# Patient Record
Sex: FEMALE
[De-identification: names, ages, dates, MRNs, and addresses within clinical notes are randomized per-mention and may not be internally consistent; named-entity substitution may affect disease eponyms.]

---

## 2017-08-01 ENCOUNTER — HOSPITAL ENCOUNTER (EMERGENCY)
Dept: HOSPITAL 42 - ED | Age: 27
Discharge: HOME | End: 2017-08-01
Payer: MEDICAID

## 2017-08-01 VITALS — BODY MASS INDEX: 19.6 KG/M2

## 2017-08-01 VITALS
RESPIRATION RATE: 15 BRPM | OXYGEN SATURATION: 99 % | HEART RATE: 89 BPM | SYSTOLIC BLOOD PRESSURE: 122 MMHG | TEMPERATURE: 99.2 F | DIASTOLIC BLOOD PRESSURE: 75 MMHG

## 2017-08-01 DIAGNOSIS — H00.11: Primary | ICD-10-CM

## 2017-08-01 NOTE — ED PDOC
Arrival/HPI





- General


Historian: Patient





- General


Chief Complaint: Eye Problem


Time Seen by Provider: 08/01/17 09:45





- History of Present Illness


Narrative History of Present Illness (Text): 





08/01/17 10:04


26 yo female come in for evaluation of Right eye swelling over upper eyelid 

associated with mild pain gradually developed for past 2 days. Otherwise, pt 

denies known trauma or injury, fever, chills, denies visual changes, blurry 

vision, double vision, eye discharge, pain with eye movement, denies contact 

use. Ambulate to Ed for evaluation, not in any apparent distress. (Emerita Pearson)





Past Medical History





- Provider Review


Nursing Documentation Reviewed: Yes





- Travel History


Have you recently traveled outside [place] w/in the past 3 mons?: No





- Past History


Past History: No Previous





- Tetanus Immunization


Tetanus Immunization: Unknown





- Cardiac


Hx Cardiac Disorders: No





- Pulmonary


Hx Respiratory Disorders: No





- Neurological


Hx Neurological Disorder: No





- HEENT


Hx HEENT Disorder: No





- Renal


Hx Renal Disorder: No





- Endocrine/Metabolic


Hx Endocrine Disorders: No





- Hematological/Oncological


Hx Anemia: Yes





- Integumentary


Hx Dermatological Disorder: No





- Musculoskeletal/Rheumatological


Hx Musculoskeletal Disorders: No





- Gastrointestinal


Hx Gastrointestinal Disorders: No





- Genitourinary/Gynecological


Hx Genitourinary Disorders: No





- Psychiatric


Hx Psychophysiologic Disorder: No


Hx Substance Use: No





Family/Social History





- Physician Review


Nursing Documentation Reviewed: Yes


Family/Social History: No Known Family HX


Smoking Status: Never Smoked


Hx Alcohol Use: No


Hx Substance Use: No





Allergies/Home Meds


Allergies/Adverse Reactions: 


Allergies





diphenhydramine [From Benadryl] Allergy (Verified 08/01/17 09:24)


 .anxiety











Review of Systems





- Physician Review


All systems were reviewed & negative as marked: Yes





- Review of Systems


Constitutional: Normal


Eyes: Eye Pain


ENT: Normal


Respiratory: Normal


Musculoskeletal: Normal


Skin: Normal


Neurological: Normal


Endocrine: Normal


Hemo/Lymphatic: Normal


Psychiatric: Normal





Physical Exam


Vital Signs Reviewed: Yes


Temperature: Afebrile


Blood Pressure: Normal


Pulse: Regular


Respiratory Rate: Normal


Appearance: Positive for: Well-Appearing, Non-Toxic, Comfortable


Pain Distress: Mild


Mental Status: Positive for: Alert and Oriented X 3





- Systems Exam


Head: Present: Normocephalic


Pupils: Present: PERRL


Extroacular Muscles: Present: EOMI, Other (no pain or limitation on extraocular 

movement B/L)


Conjunctiva: Present: Normal, Other (Right eye: mild edema over upper lateral 

eyelid  with small tender swelling noted inside upper eyelid c/w chalazion. No 

periorbital edema or erythema. Conjuctiva clear, no discharge.)


Ears: Present: Normal, Normal Canal


Mouth: Present: Moist Mucous Membranes, Normal Lips.  No: Drooling


Pharnyx: No: ERYTHEMA, EXUDATE, TONSILS ENLARGED


Nose (Internal): Present: Normal Inspection


Neck: Present: Trachea Midline.  No: Meningeal Signs, JVD


Neurological: Present: Speech Normal


Skin: Present: Warm, Dry, Normal Color


Lymphatic: No: Cervical Adenopathy


Psychiatric: Present: Alert, Oriented x 3





Medical Decision Making


ED Course and Treatment: 





08/01/17 


On re-evaluation, pt is afebrile, hemodynamicaly stable.


non-toxic.


VA: R20/20, L20/20, B/L20/20


Right eye; exam c/w chalazion.


Pt advised on course of ds.


ref. to f/u with opht in 1-2 days for re-evaluation.


Return to Ed if any worsening or new changes.








 (Emerita Pearson)


I was available for consultation during PA evaluation. The chart was reviewed 

by me, and I agree with disposition. The documented history was done by the 

physician extender. The documented physical exam was done by the physician 

extender. The documented procedures were done by the physician extender.





 (Luis Alberto Wood)





Disposition/Present on Arrival





- Present on Arrival


Any Indicators Present on Arrival: No


History of DVT/PE: No


History of Uncontrolled Diabetes: No


Urinary Catheter: No


History of Decub. Ulcer: No


History Surgical Site Infection Following: None





- Disposition


Have Diagnosis and Disposition been Completed?: Yes


Disposition Time: 09:48





- Disposition


Diagnosis: 


 Chalazion





Disposition: HOME/ ROUTINE


Condition: STABLE


Discharge Instructions (ExitCare):  Chalazion (ED)


Print Language: Yoruba


Additional Instructions: 


WARM COMPRESSES TO RIGHT EYE 





TAKE MEDICATION AS PRESCRIBED





FOLLOW UP WITH OPHTHALMOLOGY IN 2-3 DAYS FOR RE-EVALUATION.


RETURN TO ED IF ANY WORSENING OR NEW CHANGES.


Prescriptions: 


Neomycin/Polymyxin/Dexamethaso [Dexamethasone/Neomycin/Polymyxin 5 Ml] 2 drop 

RIGHTEYE Q4 #1 bottle


Referrals: 


Gennaro Smith MD [Staff Provider] - Follow up with primary


Forms:  [facility] (Portuguese), WORK NOTE

## 2017-09-24 ENCOUNTER — HOSPITAL ENCOUNTER (EMERGENCY)
Dept: HOSPITAL 42 - ED | Age: 27
Discharge: HOME | End: 2017-09-24
Payer: MEDICAID

## 2017-09-24 VITALS
SYSTOLIC BLOOD PRESSURE: 111 MMHG | HEART RATE: 74 BPM | DIASTOLIC BLOOD PRESSURE: 73 MMHG | RESPIRATION RATE: 18 BRPM | OXYGEN SATURATION: 97 %

## 2017-09-24 VITALS — TEMPERATURE: 98.3 F

## 2017-09-24 VITALS — BODY MASS INDEX: 19.6 KG/M2

## 2017-09-24 DIAGNOSIS — D64.9: ICD-10-CM

## 2017-09-24 DIAGNOSIS — R10.9: Primary | ICD-10-CM

## 2017-09-24 LAB
ALBUMIN/GLOB SERPL: 1.4 {RATIO} (ref 1.1–1.8)
ALP SERPL-CCNC: 52 U/L (ref 38–126)
ALT SERPL-CCNC: 30 U/L (ref 7–56)
APPEARANCE UR: CLEAR
AST SERPL-CCNC: 25 U/L (ref 14–36)
BACTERIA #/AREA URNS HPF: (no result) /[HPF]
BASOPHILS # BLD AUTO: 0.01 K/MM3 (ref 0–2)
BASOPHILS NFR BLD: 0.1 % (ref 0–3)
BILIRUB SERPL-MCNC: 0.4 MG/DL (ref 0.2–1.3)
BILIRUB UR-MCNC: NEGATIVE MG/DL
BUN SERPL-MCNC: 11 MG/DL (ref 7–21)
CALCIUM SERPL-MCNC: 8.9 MG/DL (ref 8.4–10.5)
CHLORIDE SERPL-SCNC: 106 MMOL/L (ref 98–107)
CO2 SERPL-SCNC: 24 MMOL/L (ref 21–33)
COLOR UR: YELLOW
EOSINOPHIL # BLD: 0.1 10*3/UL (ref 0–0.7)
EOSINOPHIL NFR BLD: 0.8 % (ref 1.5–5)
EPI CELLS #/AREA URNS HPF: (no result) /HPF (ref 0–5)
ERYTHROCYTE [DISTWIDTH] IN BLOOD BY AUTOMATED COUNT: 13 % (ref 11.5–14.5)
GLOBULIN SER-MCNC: 3.1 GM/DL
GLUCOSE SERPL-MCNC: 93 MG/DL (ref 70–110)
GLUCOSE UR STRIP-MCNC: NEGATIVE MG/DL
GRANULOCYTES # BLD: 4.9 10*3/UL (ref 1.4–6.5)
GRANULOCYTES NFR BLD: 68.6 % (ref 50–68)
HCT VFR BLD CALC: 36.9 % (ref 36–48)
KETONES UR STRIP-MCNC: NEGATIVE MG/DL
LEUKOCYTE ESTERASE UR-ACNC: (no result) LEU/UL
LYMPHOCYTES # BLD: 1.8 10*3/UL (ref 1.2–3.4)
LYMPHOCYTES NFR BLD AUTO: 24.5 % (ref 22–35)
MCH RBC QN AUTO: 29.6 PG (ref 25–35)
MCHC RBC AUTO-ENTMCNC: 33.3 G/DL (ref 31–37)
MCV RBC AUTO: 88.7 FL (ref 80–105)
MONOCYTES # BLD AUTO: 0.4 10*3/UL (ref 0.1–0.6)
MONOCYTES NFR BLD: 6 % (ref 1–6)
PH UR STRIP: 6 [PH] (ref 4.7–8)
PLATELET # BLD: 155 10^3/UL (ref 120–450)
PMV BLD AUTO: 10.8 FL (ref 7–11)
POTASSIUM SERPL-SCNC: 4.1 MMOL/L (ref 3.6–5)
PROT SERPL-MCNC: 7.5 G/DL (ref 5.8–8.3)
PROT UR STRIP-MCNC: NEGATIVE MG/DL
RBC # UR STRIP: NEGATIVE /UL
RBC #/AREA URNS HPF: NEGATIVE /HPF (ref 0–2)
SODIUM SERPL-SCNC: 143 MMOL/L (ref 132–148)
SP GR UR STRIP: 1.02 (ref 1–1.03)
UROBILINOGEN UR STRIP-ACNC: 0.2 E.U./DL
WBC # BLD AUTO: 7.2 10^3/UL (ref 4.5–11)

## 2017-09-24 NOTE — US
HISTORY:

Left lower quadrant pain



COMPARISON:

1/18/2016.



TECHNIQUE:

Transabdominal pelvic ultrasound was performed.



FINDINGS:



UTERUS:

Measures 8.6 x 3.6 x 4.6 cm. The uterus is anteverted.  There is a 

3.4 x 3.4 x 2.8 cm heterogeneous predominantly hyperechoic mass in 

the fundus and posterior wall of the midbody of the uterus.



ENDOMETRIUM:

Measures 5.0 mm in diameter. Unremarkable. 



CERVIX:

No cervical abnormality identified.



RIGHT OVARY:

Measures 2.3 x 1.9 x 2.0 cm. No solid mass. Normal flow. 



LEFT OVARY:

Measures 2.9 x 2.4 x 2.8 cm. No solid mass. Normal flow. There is a 

3.0 cm echogenic lesion in the ovary with mild increased vascularity.



FREE FLUID:

There is small amount free fluid noted.



OTHER FINDINGS:

None. 



IMPRESSION:

1. 3.4 cm heterogeneous fundal and posterior wall mass in the uterus 

is nonspecific.  The differential considerations include fibroid 

however other neoplasms cannot be entirely excluded. Transvaginal 

pelvic ultrasound/MRI would be helpful for further evaluation.



2. Question of hemorrhagic cyst in the left ovary.

## 2017-09-24 NOTE — ED PDOC
Arrival/HPI





- General


Chief Complaint: Abdominal Pain


Time Seen by Provider: 09/24/17 10:02


Historian: Patient





- History of Present Illness


Narrative History of Present Illness (Text): 


09/24/17 10:23





A 27 year old female, with no significant past medical history, presents with 2 

week duration, intermittent left lower quadrant abdominal pain. The patient 

denies any  symptoms, fevers, chills, headache, dizziness, chest pain, 

shortness of breath, dyspnea on exertion, cough, nausea, vomiting, diarrhea, 

back pain, neck pain, dysuria, or any other complaint.








PMD: Dr. ARCENIO Umanzor





Time/Duration: Other (2 weeks)


Symptom Onset: Sudden


Symptom Course: Unchanged


Activities at Onset: Rest, Light


Context: Home





Past Medical History





- Provider Review


Nursing Documentation Reviewed: Yes





- Past History


Past History: No Previous





- Infectious Disease


Hx of Infectious Diseases: None





- Tetanus Immunization


Tetanus Immunization: Unknown





- Cardiac


Hx Cardiac Disorders: No





- Pulmonary


Hx Respiratory Disorders: No





- Neurological


Hx Neurological Disorder: No





- HEENT


Hx HEENT Disorder: No





- Renal


Hx Renal Disorder: No





- Endocrine/Metabolic


Hx Endocrine Disorders: No





- Hematological/Oncological


Hx Anemia: Yes





- Integumentary


Hx Dermatological Disorder: No





- Musculoskeletal/Rheumatological


Hx Musculoskeletal Disorders: No





- Gastrointestinal


Hx Gastrointestinal Disorders: No





- Genitourinary/Gynecological


Hx Genitourinary Disorders: No





- Psychiatric


Hx Psychophysiologic Disorder: No


Hx Substance Use: No





- Anesthesia


Hx Anesthesia: No





Family/Social History





- Physician Review


Nursing Documentation Reviewed: Yes


Family/Social History: No Known Family HX


Smoking Status: Never Smoked


Hx Alcohol Use: No


Hx Substance Use: No





Allergies/Home Meds


Allergies/Adverse Reactions: 


Allergies





diphenhydramine [From Benadryl] Allergy (Verified 09/24/17 10:00)


 .anxiety











Review of Systems





- Physician Review


All systems were reviewed & negative as marked: Yes





Physical Exam





- Physical Exam


Narrative Physical Exam (Text): 


- Review of Systems





Constitutional: Normal.  absent: Fatigue, Weight Change, Fevers


Eyes: Normal


ENT: denies sore throat, denies tristhmus


Respiratory: Normal.  absent: SOB, Cough, Sputum


Cardiovascular: absent: Chest Pain, Palpitations, Syncope 


Gastrointestinal: (+) LLQ Abdominal Pain.  absent: Diarrhea, Nausea, Vomiting


Genitourinary: Normal.  absent: Dysuria, Frequency, Hematuria, vaginal bleeding


Musculoskeletal: Normal.  absent: Arthralgias, Back Pain, Neck Pain


Skin: no rashes, no erythema


Neurological: absent: Focal Weakness


Endocrine: Normal


Hemo/Lymphatic: Normal


Psychiatric: No suicidal or homicidal ideations





Physical exam





Patient appears age appropriate in no distress, speaking full sentences without 

difficulty





- Systems Exam


Head: Present: Atraumatic, Normocephalic


Pupils: Present: PERRL


Extroacular Muscles: Present: EOMI


Conjunctiva: Present: Normal


Mouth: Present: Moist Mucous Membranes


Neck: Present: Normal Range of Motion.  No: MIDLINE TENDERNESS, Paraspinal 

Tenderness


Respiratory/Chest: Present: Clear to Auscultation, Good Air Exchange.  No: 

Respiratory Distress, Accessory Muscle Use, Tachypneic


Cardiovascular: Present: Regular Rate and Rhythm, Normal S1, S2, Peripheal 

Pulses Present.  No: Murmurs


Abdomen: Present: Suprapubic and LLQ Tenderness to palpation. Normal Bowel 

Sounds.  No: Distention, Peritoneal Signs, Rebound, Guarding


Back: Present: Normal Inspection.  No: Midline Tenderness, Paraspinal Tenderness


Upper Extremity: Present: Normal Inspection.  No: Cyanosis, Edema


Lower Extremity: Present: Normal Inspection.  No: Edema


Neurological: Present: GCS=15, Speech Normal, cranial nerves II through XII 

fully intact with no cerebellar abnormality, neurosensory fully intact. No 

focal neurological deficits.


Skin: Present: Warm, Dry, Normal Color.  No: Rashes


Lymphatic: Present: OX3, NI, NC


Psychiatric: Present: Alert, Oriented x 3, Normal Insight, Normal Concentration











Vital Signs Reviewed: Yes


Vital Signs











  Temp Pulse Resp BP Pulse Ox


 


 09/24/17 09:58  98.3 F  84  16  124/80  100











Temperature: Afebrile


Blood Pressure: Normal


Pulse: Regular


Respiratory Rate: Normal


Appearance: Positive for: Well-Appearing, Non-Toxic, Comfortable


Pain Distress: None


Mental Status: Positive for: Alert and Oriented X 3





Medical Decision Making


ED Course and Treatment: 


09/24/17 10:29





Impression:


A 27 year old female with 2 week duration, intermittent LLQ abdominal pain. On 

exam, patient has Suprapubic and LLQ tenderness on palpation.





Differential Diagnosis included but are not limited to:  UTI vs. Ovarian Cysts








Plan:


-- Pelvic Ultrasound 


-- Labs


-- Urinalysis


-- Reassess and disposition








Prior Visits:


Notes and results from previous visits were reviewed. Patient was last seen in 

the emergency department on 08/01/2017 for right eye swelling and mild pain.





Progress Notes:





09/24/17 10:29: Pregnancy test negative.





09/24/17 12:22


UA with no evidence for infection


Cx and GC/Chlam sent





US Dr Avila IMPRESSION:


1. 3.4 cm heterogeneous fundal and posterior wall mass in the uterus is 

nonspecific.  The differential considerations include fibroid however other 

neoplasms cannot be entirely excluded. Transvaginal pelvic ultrasound/MRI would 

be helpful for further evaluation.


2. Question of hemorrhagic cyst in the left ovary.





pt informed of finding and instructed to fu with GYN specialist for further w/u





On reevaluation, patient reports that she feels much better and would like to 

be discharged home. Patient's repeat abdominal exam is soft, nontender, non 

distended with positive bowel sounds in all 4 quadrants and no peritoneal 

signs. Patient is tolerating PO without any difficulty.





Pt states she understands to return to the ER right away for new or worsening 

symptoms or for inability to f/u with PMD or specialist as instructed. 





Patient states that she fully agrees with and understands discharge 

instructions. States that she agrees with the plan and disposition. Verbalized 

and repeated discharge instructions and plan. I have given the patient 

opportunity to ask any additional questions. 





- Lab Interpretations


Lab Results: 








 09/24/17 10:29 





 09/24/17 10:29 





 Lab Results





09/24/17 10:29: Sodium 143, Potassium 4.1, Chloride 106, Carbon Dioxide 24, 

Anion Gap 17, BUN 11, Creatinine 0.6, Est GFR (African Amer) > 60, Est GFR (Non-

Af Amer) > 60, Random Glucose 93, Calcium 8.9, Total Bilirubin 0.4, AST 25, ALT 

30, Alkaline Phosphatase 52, Total Protein 7.5, Albumin 4.4, Globulin 3.1, 

Albumin/Globulin Ratio 1.4


09/24/17 10:29: WBC 7.2, RBC 4.16, Hgb 12.3, Hct 36.9, MCV 88.7, MCH 29.6, MCHC 

33.3, RDW 13.0, Plt Count 155, MPV 10.8, Gran % 68.6 H, Lymph % (Auto) 24.5, 

Mono % (Auto) 6.0, Eos % (Auto) 0.8 L, Baso % (Auto) 0.1, Gran # 4.90, Lymph # 

1.8, Mono # 0.4, Eos # 0.1, Baso # 0.01


09/24/17 10:17: Urine Color Yellow, Urine Appearance Clear, Urine pH 6.0, Ur 

Specific Gravity 1.020, Urine Protein Negative, Urine Glucose (UA) Negative, 

Urine Ketones Negative, Urine Blood Negative, Urine Nitrate Negative, Urine 

Bilirubin Negative, Urine Urobilinogen 0.2, Ur Leukocyte Esterase Large H, 

Urine RBC Negative, Urine WBC 5 - 10, Ur Epithelial Cells Many, Urine Bacteria 

Small








I have reviewed the lab results: Yes





- RAD Interpretation


Radiology Orders: 








09/24/17 10:19


Pelvis [PELVIS ULTRASOUND] [US] Routine 














- Scribe Statement


The provider has reviewed the documentation as recorded by the Scribe


Mellisa Palomares 





Provider Scribe Attestation:


All medical record entries made by the Scribe were at my direction and 

personally dictated by me. I have reviewed the chart and agree that the record 

accurately reflects my personal performance of the history, physical exam, 

medical decision making, and the department course for this patient. I have 

also personally directed, reviewed, and agree with the discharge instructions 

and disposition.








Disposition/Present on Arrival





- Present on Arrival


Any Indicators Present on Arrival: No


History of DVT/PE: No


History of Uncontrolled Diabetes: No


Urinary Catheter: No


History of Decub. Ulcer: No


History Surgical Site Infection Following: None





- Disposition


Have Diagnosis and Disposition been Completed?: Yes


Diagnosis: 


 Abdominal pain





Disposition: HOME/ ROUTINE


Disposition Time: 12:24


Patient Plan: Discharge


Condition: GOOD


Discharge Instructions (ExitCare):  Ovarian Cyst (ED)


Additional Instructions: 


PLEASE RETURN TO THE EMERGENCY DEPARTMENT FOR NEW OR WORSENING SYMPTOMS. RETURN 

RIGHT AWAY IF YOU CANNOT FOLLOW UP WITH YOUR PRIMARY CARE DOCTOR, CLINIC, OR 

SPECIALIST IN 1-2 DAYS. 


Prescriptions: 


Ibuprofen [Motrin] 600 mg PO Q8 PRN #12 tab


 PRN Reason: Pain, Moderate (4-7)


Referrals: 


Shin Umanzor APN [Primary Care Provider] - Follow up with primary


Alvarado Paredes [Medical Doctor] - Follow up with primary


Forms:  [employer] [facility] (English), WORK NOTE

## 2018-02-13 ENCOUNTER — HOSPITAL ENCOUNTER (EMERGENCY)
Dept: HOSPITAL 42 - ED | Age: 28
Discharge: HOME | End: 2018-02-13
Payer: MEDICAID

## 2018-02-13 VITALS
TEMPERATURE: 99 F | OXYGEN SATURATION: 100 % | HEART RATE: 96 BPM | RESPIRATION RATE: 18 BRPM | DIASTOLIC BLOOD PRESSURE: 72 MMHG | SYSTOLIC BLOOD PRESSURE: 114 MMHG

## 2018-02-13 VITALS — BODY MASS INDEX: 19.6 KG/M2

## 2018-02-13 DIAGNOSIS — J11.1: Primary | ICD-10-CM

## 2018-02-13 NOTE — ED PDOC
Arrival/HPI





- General


Chief Complaint: Flu-like Symptoms


Time Seen by Provider: 02/13/18 11:38


Historian: Patient





- History of Present Illness


Narrative History of Present Illness (Text): 


02/13/18 12:15


A 27 year old female, with no significant past medical history, presents to the 

emergency department complaining of sore throat and myalgias since last night. 

Patient reports also experiencing chills and slight coughing. Patient denies 

any other symptoms. Has been asked if she wants to take flu test or to be given 

prescription for Tamiflu. Patient refuses flu test and asks for prescription 

instead.





PMD: Dr. Umanzor





Time/Duration: Other (last night)


Symptom Onset: Sudden


Symptom Course: Unchanged





Past Medical History





- Provider Review


Nursing Documentation Reviewed: Yes





- Past History


Past History: No Previous





- Infectious Disease


Hx of Infectious Diseases: None





- Tetanus Immunization


Tetanus Immunization: Unknown





- Cardiac


Hx Cardiac Disorders: No





- Pulmonary


Hx Respiratory Disorders: No





- Neurological


Hx Neurological Disorder: No





- HEENT


Hx HEENT Disorder: No





- Renal


Hx Renal Disorder: No





- Endocrine/Metabolic


Hx Endocrine Disorders: No





- Hematological/Oncological


Hx Anemia: Yes





- Integumentary


Hx Dermatological Disorder: No





- Musculoskeletal/Rheumatological


Hx Musculoskeletal Disorders: No





- Gastrointestinal


Hx Gastrointestinal Disorders: No





- Genitourinary/Gynecological


Hx Genitourinary Disorders: No





- Psychiatric


Hx Psychophysiologic Disorder: No


Hx Substance Use: No





- Anesthesia


Hx Anesthesia: No


Hx Anesthesia Reactions: No


Hx Malignant Hyperthermia: No





Family/Social History





- Physician Review


Nursing Documentation Reviewed: Yes


Family/Social History: No Known Family HX


Smoking Status: Never Smoked


Hx Alcohol Use: No


Hx Substance Use: No





Allergies/Home Meds


Allergies/Adverse Reactions: 


Allergies





diphenhydramine [From Benadryl] Allergy (Verified 09/24/17 10:00)


 .anxiety











Review of Systems





- Physician Review


All systems were reviewed & negative as marked: Yes





- Review of Systems


Constitutional: Night Sweats.  absent: Fevers


ENT: Sore Throat


Respiratory: Cough (slightly)


Musculoskeletal: Myalgias





Physical Exam


Vital Signs Reviewed: Yes


Vital Signs











  Temp Pulse Resp BP Pulse Ox


 


 02/13/18 11:38  99 F  96 H  18  114/72  100











Temperature: Afebrile


Blood Pressure: Normal


Pulse: Regular


Respiratory Rate: Normal


Appearance: Positive for: Well-Appearing


Pain Distress: None


Mental Status: Positive for: Alert and Oriented X 3





- Systems Exam


Head: Present: Atraumatic, Normocephalic


Pupils: Present: PERRL


Extroacular Muscles: Present: EOMI


Conjunctiva: Present: Normal


Mouth: Present: Moist Mucous Membranes


Neck: Present: Normal Range of Motion


Respiratory/Chest: Present: Clear to Auscultation, Good Air Exchange.  No: 

Respiratory Distress, Accessory Muscle Use


Cardiovascular: Present: Regular Rate and Rhythm, Normal S1, S2.  No: Murmurs


Abdomen: Present: Normal Bowel Sounds.  No: Tenderness, Distention, Peritoneal 

Signs


Back: Present: Normal Inspection


Upper Extremity: Present: Normal Inspection.  No: Cyanosis, Edema


Lower Extremity: Present: Normal Inspection.  No: Edema


Neurological: Present: GCS=15, CN II-XII Intact, Speech Normal


Skin: Present: Warm, Dry, Normal Color.  No: Rashes


Psychiatric: Present: Alert, Oriented x 3, Normal Insight, Normal Concentration





Medical Decision Making


ED Course and Treatment: 


02/13/18 12:15


Impression: 27 year old female with sore throat, myalgias, slight cough, and 

chills. No acute findings on physical examination.





Plan:


-- Tamiflu


-- Reassess and disposition





Progress Notes:











02/13/18 16:02


high clinical suspcition for flu. [email or web address] 


02/13/18 16:02


pt observed on phone in nad. well appearing speaking full sentences in nad





- Scribe Statement


The provider has reviewed the documentation as recorded by the Leticia Oglesby





Provider Scribe Attestation:


All medical record entries made by the Rachealibapril were at my direction and 

personally dictated by me. I have reviewed the chart and agree that the record 

accurately reflects my personal performance of the history, physical exam, 

medical decision making, and the department course for this patient. I have 

also personally directed, reviewed, and agree with the discharge instructions 

and disposition.








Disposition/Present on Arrival





- Present on Arrival


Any Indicators Present on Arrival: No


History of DVT/PE: No


History of Uncontrolled Diabetes: No


Urinary Catheter: No


History of Decub. Ulcer: No


History Surgical Site Infection Following: None





- Disposition


Have Diagnosis and Disposition been Completed?: Yes


Diagnosis: 


 Influenza-like illness





Disposition: HOME/ ROUTINE


Disposition Time: 12:00


Condition: STABLE


Discharge Instructions (ExitCare):  Viral Syndrome (ED)


Print Language: Ethiopian


Additional Instructions: 


follow up with your doctor. return to er with worsening symptoms or concerns. 


Prescriptions: 


Oseltamivir Phosphate [Tamiflu] 75 mg PO BID #14 capsule


Referrals: 


Wilma Umanzor DO [Primary Care Provider] - Follow up with primary


Forms:  [facility] (English), WORK NOTE